# Patient Record
Sex: MALE | Race: WHITE
[De-identification: names, ages, dates, MRNs, and addresses within clinical notes are randomized per-mention and may not be internally consistent; named-entity substitution may affect disease eponyms.]

---

## 2018-08-08 ENCOUNTER — HOSPITAL ENCOUNTER (EMERGENCY)
Dept: HOSPITAL 26 - MED | Age: 33
Discharge: HOME | End: 2018-08-08
Payer: COMMERCIAL

## 2018-08-08 VITALS — BODY MASS INDEX: 21.98 KG/M2 | HEIGHT: 68 IN | WEIGHT: 145 LBS

## 2018-08-08 VITALS — SYSTOLIC BLOOD PRESSURE: 123 MMHG | DIASTOLIC BLOOD PRESSURE: 81 MMHG

## 2018-08-08 DIAGNOSIS — S73.101A: Primary | ICD-10-CM

## 2018-08-08 DIAGNOSIS — M54.5: ICD-10-CM

## 2018-08-08 DIAGNOSIS — J45.909: ICD-10-CM
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
## 2018-08-08 DIAGNOSIS — F17.200: ICD-10-CM

## 2018-08-08 DIAGNOSIS — S83.91XA: ICD-10-CM
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
## 2018-08-10 ENCOUNTER — HOSPITAL ENCOUNTER (EMERGENCY)
Dept: HOSPITAL 26 - MED | Age: 33
Discharge: HOME | End: 2018-08-10
Payer: COMMERCIAL

## 2018-08-10 VITALS — SYSTOLIC BLOOD PRESSURE: 126 MMHG | DIASTOLIC BLOOD PRESSURE: 85 MMHG

## 2018-08-10 VITALS — BODY MASS INDEX: 21.98 KG/M2 | WEIGHT: 145 LBS | HEIGHT: 68 IN

## 2018-08-10 VITALS — DIASTOLIC BLOOD PRESSURE: 83 MMHG | SYSTOLIC BLOOD PRESSURE: 128 MMHG

## 2018-08-10 DIAGNOSIS — S63.502A: Primary | ICD-10-CM
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
## 2018-08-10 PROCEDURE — 99284 EMERGENCY DEPT VISIT MOD MDM: CPT

## 2018-08-10 PROCEDURE — 73110 X-RAY EXAM OF WRIST: CPT

## 2018-08-10 PROCEDURE — 29125 APPL SHORT ARM SPLINT STATIC: CPT

## 2018-08-10 NOTE — NUR
Velcro wrist splint applied to left wrist and shoulder sling, PMSC's assessed 
and within normal limits. Patient tolerated splint well.
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
## 2019-02-24 NOTE — NUR
PT BIBA C/O UPPER ABD PAIN, SOB. PT STATES HE IS FEELING ANXIOUS AND SOB, DRANK 
8 SHOTS OF WISKEY, METH USE YESTERDAY. DENIES N/V/D, OR CP.



--LUNG SOUND CLEAR BL, BOWEL SOUNDS ACTIVE X4 QUAD. SKIN WARM, DRY AND INTACT. 
AAOX4. 

--PT IN GOWN IN BED; BED IN LOWER LOCKED POSITION, BEDRAILS UP X2. CARDIAC 
MONITOR ON PT. ER MD MADE AWARE OF PT STATUS.



PMH: ASTHMA

RX: ALBUTEROL INHALER

## 2019-02-25 NOTE — NUR
Patient discharged with v/s stable. Written and verbal after care instructions 
given and explained. 

Patient verbalized understanding. Ambulatory with steady gait. All questions 
addressed prior to discharge. Advised to follow up with PMD. Refuses offer of 
shelter placement.  Given list of available shelters in surrounding areas.
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
## 2019-04-27 NOTE — NUR
PATIENT PRESENTS TO ED WITH ADMITS TO ETOH ABUSE TODAY--NO TREMORS NOTED--FULL 
CLEAR SPEECH

DENIES N/V---ADMITS TO GENERALIZED PAIN

DENIES RECENT INJURY/TRAUMA---PT ASKING FOR FOOD---WILL PROVIDE 

 .  SKIN IS PINK/WARM/DRY; AAOX4 WITH EVEN AND STEADY GAIT; LUNGS CLEAR BL; HR 
EVEN AND REGULAR; PT DENIES ANY FEVER, CP, SOB, OR COUGH AT THIS TIME; PATIENT 
STATES PAIN OF 5/10 AT THIS TIME; VSS; PATIENT POSITIONED FOR COMFORT; HOB 
ELEVATED; BEDRAILS UP X2; BED DOWN. ER MD MADE AWARE OF PT STATUS.

## 2021-02-02 NOTE — NUR
Patient discharged with v/s stable. Written and verbal after care instructions 
given and explained. 

Patient alert, oriented and verbalized understanding of instructions. 
Ambulatory with steady gait. All questions addressed prior to discharge. ID 
band removed. Patient advised to follow up with PMD. Rx of ALBUTEROL given. 
Patient educated on indication of medication including possible reaction and 
side effects. Opportunity to ask questions provided and answered.

## 2021-07-09 NOTE — NUR
Patient discharged with v/s stable. Written and verbal after care instructions 
given and explained. 

Patient alert, oriented and verbalized understanding of instructions. 
Ambulatory with steady gait. All questions addressed prior to discharge. ID 
band removed. Patient advised to follow up with PMD. Rx of NAPROXEN, PRENISONE, 
LIDOCAINE, METHOCARBAMOL given. Patient educated on indication of medication 
including possible reaction and side effects. Opportunity to ask questions 
provided and answered.
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
